# Patient Record
Sex: FEMALE | Race: WHITE | Employment: UNEMPLOYED | URBAN - METROPOLITAN AREA
[De-identification: names, ages, dates, MRNs, and addresses within clinical notes are randomized per-mention and may not be internally consistent; named-entity substitution may affect disease eponyms.]

---

## 2017-03-23 PROBLEM — R09.89 GLOBUS SENSATION: Status: ACTIVE | Noted: 2017-03-23

## 2017-03-23 PROBLEM — E04.2 MULTINODULAR GOITER: Status: ACTIVE | Noted: 2017-03-23

## 2017-12-07 ENCOUNTER — HOSPITAL ENCOUNTER (OUTPATIENT)
Dept: MAMMOGRAPHY | Age: 57
Discharge: HOME | End: 2017-12-07
Attending: FAMILY MEDICINE
Payer: COMMERCIAL

## 2017-12-07 DIAGNOSIS — Z13.820 OSTEOPOROSIS SCREENING: ICD-10-CM

## 2017-12-07 DIAGNOSIS — Z12.39 BREAST CANCER SCREENING: ICD-10-CM

## 2017-12-07 PROBLEM — M85.80 OSTEOPENIA DETERMINED BY X-RAY: Status: ACTIVE | Noted: 2017-12-07

## 2017-12-07 PROCEDURE — 77067 SCR MAMMO BI INCL CAD: CPT

## 2017-12-07 PROCEDURE — 77080 DXA BONE DENSITY AXIAL: CPT

## 2017-12-07 NOTE — PROGRESS NOTES
Dear Mrs. Hanna Wagner     Your recent Dexa scan show Osteopenia. Recomendations are :  -OTC Vitamin D 1000 international units daily and continue your weekly Rx. of Vitamin D3 if you were previously on.  -Daily oral intake of calcium : 600 mg to  u1200 mg day. - Aerobic exercise with goal of 150 minutes a week  - Twice a week weight bearing exercises  -Eat healthy with 3-5 servings daily of fruit and vegetables and low fat or fat free products. Follow up next year for your preventive physical exam and repeat Dexa scan in 2 years.     Thanks,  Dr. Ailyn Gil

## 2017-12-08 NOTE — PROGRESS NOTES
Dear Eduardo Ayanna Ferrara     Your recent mammogram showed :No mammographic evidence of malignancy.      Recommend annual mammogram in one year.  A reminder letter will be scheduled.     Thanks,  Dr. Avila Minus

## 2018-12-20 ENCOUNTER — HOSPITAL ENCOUNTER (OUTPATIENT)
Dept: CT IMAGING | Age: 58
Discharge: HOME | End: 2018-12-20
Attending: UROLOGY
Payer: COMMERCIAL

## 2018-12-20 DIAGNOSIS — N20.0 RENAL STONE: ICD-10-CM

## 2018-12-20 PROCEDURE — 74176 CT ABD & PELVIS W/O CONTRAST: CPT

## 2018-12-21 NOTE — PROGRESS NOTES
Only stone is a TINY 2 mm stone in the bottom of the LEFT kidney. Good news! Continue urocit K and see me in a year with kub (may already be set up).

## 2018-12-27 PROBLEM — E66.01 SEVERE OBESITY (HCC): Status: ACTIVE | Noted: 2018-12-27

## 2019-01-21 ENCOUNTER — HOSPITAL ENCOUNTER (OUTPATIENT)
Dept: MAMMOGRAPHY | Age: 59
Discharge: HOME | End: 2019-01-21
Payer: COMMERCIAL

## 2019-01-21 DIAGNOSIS — Z12.39 BREAST CANCER SCREENING: ICD-10-CM

## 2019-01-21 PROCEDURE — 77067 SCR MAMMO BI INCL CAD: CPT

## 2019-01-22 NOTE — PROGRESS NOTES
Dear Mrs. Hieu Temple     Your recent mammogram showed :No mammographic evidence of malignancy.      Recommend annual mammogram in one year.  A reminder letter will be scheduled.     Thanks,  Dr. Meera Sandoval

## 2021-12-16 ENCOUNTER — TRANSCRIBE ORDER (OUTPATIENT)
Dept: SCHEDULING | Age: 61
End: 2021-12-16

## 2021-12-16 DIAGNOSIS — Z12.31 VISIT FOR SCREENING MAMMOGRAM: Primary | ICD-10-CM

## 2021-12-16 DIAGNOSIS — Z78.0 MENOPAUSE: Primary | ICD-10-CM

## 2022-01-07 ENCOUNTER — TRANSCRIBE ORDER (OUTPATIENT)
Dept: SCHEDULING | Age: 62
End: 2022-01-07

## 2022-01-07 DIAGNOSIS — E04.2 MULTINODULAR GOITER: Primary | ICD-10-CM

## 2022-05-16 ENCOUNTER — TRANSCRIBE ORDER (OUTPATIENT)
Dept: SCHEDULING | Age: 62
End: 2022-05-16

## 2022-05-16 DIAGNOSIS — Z13.820 ENCOUNTER FOR OSTEOPOROSIS SCREENING IN ASYMPTOMATIC POSTMENOPAUSAL PATIENT: Primary | ICD-10-CM

## 2022-05-16 DIAGNOSIS — Z12.31 VISIT FOR SCREENING MAMMOGRAM: Primary | ICD-10-CM

## 2022-05-16 DIAGNOSIS — Z78.0 ENCOUNTER FOR OSTEOPOROSIS SCREENING IN ASYMPTOMATIC POSTMENOPAUSAL PATIENT: Primary | ICD-10-CM
